# Patient Record
Sex: MALE | Race: WHITE
[De-identification: names, ages, dates, MRNs, and addresses within clinical notes are randomized per-mention and may not be internally consistent; named-entity substitution may affect disease eponyms.]

---

## 2020-02-01 ENCOUNTER — HOSPITAL ENCOUNTER (EMERGENCY)
Dept: HOSPITAL 94 - ER | Age: 30
Discharge: HOME | End: 2020-02-01
Payer: COMMERCIAL

## 2020-02-01 VITALS — DIASTOLIC BLOOD PRESSURE: 93 MMHG | SYSTOLIC BLOOD PRESSURE: 123 MMHG

## 2020-02-01 VITALS — BODY MASS INDEX: 23.07 KG/M2 | WEIGHT: 170.35 LBS | HEIGHT: 72 IN

## 2020-02-01 DIAGNOSIS — S60.222A: ICD-10-CM
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
## 2020-02-01 DIAGNOSIS — F17.200: ICD-10-CM
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
## 2020-02-01 DIAGNOSIS — S01.81XA: Primary | ICD-10-CM
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
## 2020-02-01 PROCEDURE — 12001 RPR S/N/AX/GEN/TRNK 2.5CM/<: CPT

## 2020-02-01 PROCEDURE — 90471 IMMUNIZATION ADMIN: CPT

## 2020-02-01 PROCEDURE — 99283 EMERGENCY DEPT VISIT LOW MDM: CPT

## 2020-02-01 PROCEDURE — 90715 TDAP VACCINE 7 YRS/> IM: CPT

## 2020-02-01 PROCEDURE — 12011 RPR F/E/E/N/L/M 2.5 CM/<: CPT

## 2020-02-01 PROCEDURE — 73120 X-RAY EXAM OF HAND: CPT
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
